# Patient Record
Sex: FEMALE | Race: BLACK OR AFRICAN AMERICAN | Employment: FULL TIME | URBAN - METROPOLITAN AREA
[De-identification: names, ages, dates, MRNs, and addresses within clinical notes are randomized per-mention and may not be internally consistent; named-entity substitution may affect disease eponyms.]

---

## 2024-04-18 ENCOUNTER — OFFICE VISIT (OUTPATIENT)
Dept: URGENT CARE | Facility: URGENT CARE | Age: 29
End: 2024-04-18
Payer: OTHER GOVERNMENT

## 2024-04-18 VITALS
HEART RATE: 98 BPM | WEIGHT: 172.3 LBS | TEMPERATURE: 98.9 F | DIASTOLIC BLOOD PRESSURE: 76 MMHG | RESPIRATION RATE: 16 BRPM | OXYGEN SATURATION: 99 % | SYSTOLIC BLOOD PRESSURE: 118 MMHG

## 2024-04-18 DIAGNOSIS — Z33.1 PREGNANT STATE, INCIDENTAL: ICD-10-CM

## 2024-04-18 DIAGNOSIS — O21.9 NAUSEA AND VOMITING IN PREGNANCY: Primary | ICD-10-CM

## 2024-04-18 PROCEDURE — 99203 OFFICE O/P NEW LOW 30 MIN: CPT | Performed by: PHYSICIAN ASSISTANT

## 2024-04-18 RX ORDER — DOXYLAMINE SUCCINATE AND PYRIDOXINE HYDROCHLORIDE, DELAYED RELEASE TABLETS 10 MG/10 MG 10; 10 MG/1; MG/1
1 TABLET, DELAYED RELEASE ORAL 2 TIMES DAILY PRN
Qty: 30 TABLET | Refills: 0 | Status: SHIPPED | OUTPATIENT
Start: 2024-04-18

## 2024-04-18 RX ORDER — MECLIZINE HCL 12.5 MG 12.5 MG/1
12.5 TABLET ORAL 3 TIMES DAILY PRN
Qty: 30 TABLET | Refills: 0 | Status: SHIPPED | OUTPATIENT
Start: 2024-04-18

## 2024-04-18 ASSESSMENT — ENCOUNTER SYMPTOMS
FEVER: 0
PALPITATIONS: 0
WHEEZING: 0
SINUS PRESSURE: 0
DIARRHEA: 0
SHORTNESS OF BREATH: 0
SORE THROAT: 0
CHILLS: 0
FATIGUE: 1
NAUSEA: 1
CARDIOVASCULAR NEGATIVE: 1
BLOOD IN STOOL: 0
VOMITING: 1
SINUS PAIN: 0
COUGH: 0
RHINORRHEA: 0
DIZZINESS: 1
CONSTIPATION: 0
HEADACHES: 1
ABDOMINAL PAIN: 0

## 2024-04-18 ASSESSMENT — PAIN SCALES - GENERAL: PAINLEVEL: SEVERE PAIN (6)

## 2024-04-18 NOTE — PROGRESS NOTES
Evangelina Vásquez is a 28 year old, presenting for the following health issues:  Vomiting (Vomiting beginning this morning around 0400; patient states she is now feeling weak. Patient reports headache and fatigue as well. )    HPI   Acute Illness  Acute illness concerns:   Onset/Duration: today  Symptoms:  Fever: No  Chills/Sweats: No  Headache (location?): No  Sinus Pressure: No  Conjunctivitis:  No  Ear Pain: no  Rhinorrhea: No  Congestion: No  Sore Throat: No  Cough: no  Wheeze: No  Decreased Appetite: YES  Nausea: YES  Vomiting: YES, she is currently 7weeks pregnant.  This is her first pregnancy.  Diarrhea: No  Dysuria/Freq.: No  Dysuria or Hematuria: No  Fatigue/Achiness: YES, along with HA and dizziness.  No chest pain, SOB, palpitations, visual disturbances, one sided weakness or slurred speech.    Sick/Strep Exposure: No  Therapies tried and outcome: rest,fluids with minimal relief    There is no problem list on file for this patient.    Current Outpatient Medications   Medication Sig Dispense Refill    Ferrous Sulfate (IRON PO)        No current facility-administered medications for this visit.      No Known Allergies    Review of Systems   Constitutional:  Positive for fatigue. Negative for chills and fever.   HENT:  Negative for congestion, ear pain, rhinorrhea, sinus pressure, sinus pain and sore throat.    Respiratory:  Negative for cough, shortness of breath and wheezing.    Cardiovascular: Negative.  Negative for chest pain, palpitations and leg swelling.   Gastrointestinal:  Positive for nausea and vomiting. Negative for abdominal pain, blood in stool, constipation and diarrhea.   Neurological:  Positive for dizziness and headaches.   All other systems reviewed and are negative.          Objective    /76 (BP Location: Left arm, Patient Position: Sitting, Cuff Size: Adult Large)   Pulse 98   Temp 98.9  F (37.2  C) (Tympanic)   Resp 16   Wt 78.2 kg (172 lb 4.8 oz)   SpO2 99%   There is  no height or weight on file to calculate BMI.  Physical Exam  Vitals and nursing note reviewed.   Constitutional:       General: She is not in acute distress.     Appearance: Normal appearance. She is well-developed and normal weight. She is not ill-appearing.   HENT:      Head: Normocephalic and atraumatic.      Comments: TMs are intact without any erythema or bulging bilaterally.  Airway is patent.     Nose: Nose normal.      Mouth/Throat:      Lips: Pink.      Mouth: Mucous membranes are moist.      Pharynx: Oropharynx is clear. Uvula midline. No pharyngeal swelling, oropharyngeal exudate or posterior oropharyngeal erythema.      Tonsils: No tonsillar exudate.   Eyes:      General: No scleral icterus.     Extraocular Movements: Extraocular movements intact.      Conjunctiva/sclera: Conjunctivae normal.      Pupils: Pupils are equal, round, and reactive to light.   Neck:      Thyroid: No thyromegaly.   Cardiovascular:      Rate and Rhythm: Normal rate and regular rhythm.      Pulses: Normal pulses.      Heart sounds: Normal heart sounds, S1 normal and S2 normal. No murmur heard.     No friction rub. No gallop.   Pulmonary:      Effort: Pulmonary effort is normal. No accessory muscle usage, respiratory distress or retractions.      Breath sounds: Normal breath sounds and air entry. No stridor. No decreased breath sounds, wheezing, rhonchi or rales.   Abdominal:      General: Abdomen is protuberant. Bowel sounds are normal.      Palpations: Abdomen is soft. There is no hepatomegaly, splenomegaly or mass.      Tenderness: There is no abdominal tenderness. There is no right CVA tenderness, left CVA tenderness, guarding or rebound. Negative signs include Kaufman's sign, Rovsing's sign and McBurney's sign.      Comments: Pregnant abdomen   Musculoskeletal:      Cervical back: Normal range of motion and neck supple.   Lymphadenopathy:      Cervical: No cervical adenopathy.   Skin:     General: Skin is warm and dry.       Nails: There is no clubbing.   Neurological:      Mental Status: She is alert and oriented to person, place, and time.   Psychiatric:         Mood and Affect: Mood normal.         Behavior: Behavior normal.         Thought Content: Thought content normal.         Judgment: Judgment normal.              Assessment/Plan:  Nausea and vomiting in pregnancy:  Will give Doxylamine-Pyridoxine as needed for n/v and meclizine as needed for dizziness.  Recommend tylenol as needed for HA, increase fluids, and eat small meals throughout the day.  Recheck in clinic with OB if symptoms if symptoms do not improve.  To the ER if worsening n/v, fatigue or feeling like she's gonna pass out.  -     Doxylamine-Pyridoxine 10-10 MG TBEC; Take 1 tablet by mouth 2 times daily as needed (nausea, vomiting)  -     meclizine (ANTIVERT) 12.5 MG tablet; Take 1 tablet (12.5 mg) by mouth 3 times daily as needed for dizziness or nausea    Pregnant state, incidental        Madisyn See ASHLEY Daniel

## 2024-04-18 NOTE — LETTER
April 18, 2024      Fahad Viramontes  [address] [place] [address]        To Whom It May Concern:    Fahad Viramontes was seen in urgent care clinic today and is unable to work today due to her symptoms.  Please feel free to contact me via phone if you have any questions or concerns.        Sincerely,      Madisyn See ASHLEY Daniel

## 2024-04-19 ENCOUNTER — TELEPHONE (OUTPATIENT)
Dept: URGENT CARE | Facility: URGENT CARE | Age: 29
End: 2024-04-19
Payer: OTHER GOVERNMENT

## 2024-04-19 NOTE — TELEPHONE ENCOUNTER
[facility] Prior Authorization Team   Phone: [phone]    PRIOR AUTHORIZATION DENIED    DOCUMENTED DENIAL FOR ANGELINE CARO (OOO)    Medication: DOXYLAMINE-PYRIDOXINE 10-10 MG PO TBEC  Insurance Company: [employer] - Phone [phone] Fax [phone]  Denial Date: 4/19/2024  Denial Reason(s): MUST TRY/FAIL OTC VITAMIN B6      Appeal Information: IF THE PROVIDER WOULD LIKE TO APPEAL THIS DECISION PLEASE PROVIDE THE PA TEAM WITH A LETTER OF MEDICAL NECESSITY      Patient Notified: NO

## 2024-04-20 ENCOUNTER — TELEPHONE (OUTPATIENT)
Dept: FAMILY MEDICINE | Facility: CLINIC | Age: 29
End: 2024-04-20
Payer: OTHER GOVERNMENT

## 2024-04-20 NOTE — TELEPHONE ENCOUNTER
FYI - Status Update    Who is Calling: patient    Update: is needing a call back     Does caller want a call/response back: Yes     Okay to leave a detailed message?: Yes at Cell number on file:    Telephone Information:   Mobile [phone]

## 2024-04-21 NOTE — TELEPHONE ENCOUNTER
[facility]ail Pharm[facility] Prior Authorization Team   Phone: [phone]      PRIOR AUTHORIZATION DENIED    Medication: DOXYLAMINE-PYRIDOXINE 10-10 MG PO TBEC  Insurance Company: [employer] - Phone [phone] Fax [phone]  Denial Date: 4/19/2024  Denial Reason(s):         Appeal Information:   In an addition to a letter of medical necessity addressing the denial reason; the patient will need to sign release form from  authorizing their Provider/[facility] PA Team  to act on their behalf for the appeal.  Please contact patient and obtain signature and route back to me directly: ANGELINE CARO and an appeal can be sent.  Please advise,  will require this form and will not move forward with the appeal without an signature from the patient.      Patient Notified:   No. The [facility] PA Team does not notify of the denial outcomes as the patient often will ask what their provider will prescribe in place of the denied medication, or additional information in regards to other therapies they can take in place of the denied medication.  This is not something we can advise on in our department.